# Patient Record
Sex: MALE | Race: WHITE | URBAN - NONMETROPOLITAN AREA
[De-identification: names, ages, dates, MRNs, and addresses within clinical notes are randomized per-mention and may not be internally consistent; named-entity substitution may affect disease eponyms.]

---

## 2016-11-11 LAB
CHOLESTEROL, TOTAL: 209 MG/DL
CHOLESTEROL/HDL RATIO: 5
HDLC SERPL-MCNC: 42 MG/DL (ref 35–70)
LDL CHOLESTEROL CALCULATED: 131.4 MG/DL (ref 0–160)
TRIGL SERPL-MCNC: 178 MG/DL
VLDLC SERPL CALC-MCNC: 36 MG/DL

## 2017-06-08 DIAGNOSIS — N20.0 KIDNEY STONES: ICD-10-CM

## 2017-06-08 DIAGNOSIS — E34.9 TESTOSTERONE DEFICIENCY: ICD-10-CM

## 2017-06-08 PROBLEM — I10 HYPERTENSION: Status: ACTIVE | Noted: 2017-06-08

## 2017-06-08 PROBLEM — E03.9 HYPOTHYROID: Status: ACTIVE | Noted: 2017-06-08

## 2017-06-08 RX ORDER — AMLODIPINE BESYLATE 5 MG/1
5 TABLET ORAL DAILY
COMMUNITY
End: 2017-06-12 | Stop reason: SDUPTHER

## 2017-06-08 RX ORDER — LISINOPRIL 10 MG/1
10 TABLET ORAL DAILY
COMMUNITY
End: 2017-06-12 | Stop reason: SDUPTHER

## 2017-06-12 ENCOUNTER — OFFICE VISIT (OUTPATIENT)
Dept: FAMILY MEDICINE CLINIC | Age: 42
End: 2017-06-12
Payer: COMMERCIAL

## 2017-06-12 VITALS
HEIGHT: 68 IN | HEART RATE: 68 BPM | WEIGHT: 222 LBS | SYSTOLIC BLOOD PRESSURE: 112 MMHG | DIASTOLIC BLOOD PRESSURE: 82 MMHG | BODY MASS INDEX: 33.65 KG/M2

## 2017-06-12 DIAGNOSIS — I10 ESSENTIAL HYPERTENSION: Primary | ICD-10-CM

## 2017-06-12 DIAGNOSIS — E34.9 TESTOSTERONE DEFICIENCY: ICD-10-CM

## 2017-06-12 DIAGNOSIS — N20.0 KIDNEY STONES: ICD-10-CM

## 2017-06-12 DIAGNOSIS — E03.9 HYPOTHYROIDISM, UNSPECIFIED TYPE: ICD-10-CM

## 2017-06-12 PROCEDURE — 99213 OFFICE O/P EST LOW 20 MIN: CPT | Performed by: FAMILY MEDICINE

## 2017-06-12 RX ORDER — AMLODIPINE BESYLATE 5 MG/1
5 TABLET ORAL DAILY
Qty: 90 TABLET | Refills: 3 | Status: SHIPPED | OUTPATIENT
Start: 2017-06-12 | End: 2017-12-13 | Stop reason: DRUGHIGH

## 2017-06-12 RX ORDER — LISINOPRIL 10 MG/1
10 TABLET ORAL DAILY
Qty: 90 TABLET | Refills: 3 | Status: SHIPPED | OUTPATIENT
Start: 2017-06-12

## 2017-06-12 ASSESSMENT — ENCOUNTER SYMPTOMS
CHEST TIGHTNESS: 0
WHEEZING: 0
SORE THROAT: 0
ABDOMINAL PAIN: 0
ABDOMINAL DISTENTION: 0
SHORTNESS OF BREATH: 0

## 2017-06-12 ASSESSMENT — PATIENT HEALTH QUESTIONNAIRE - PHQ9
SUM OF ALL RESPONSES TO PHQ QUESTIONS 1-9: 0
1. LITTLE INTEREST OR PLEASURE IN DOING THINGS: 0
2. FEELING DOWN, DEPRESSED OR HOPELESS: 0
SUM OF ALL RESPONSES TO PHQ9 QUESTIONS 1 & 2: 0

## 2017-09-11 ENCOUNTER — OFFICE VISIT (OUTPATIENT)
Dept: FAMILY MEDICINE CLINIC | Age: 42
End: 2017-09-11
Payer: COMMERCIAL

## 2017-09-11 VITALS
BODY MASS INDEX: 35.28 KG/M2 | DIASTOLIC BLOOD PRESSURE: 90 MMHG | WEIGHT: 232 LBS | SYSTOLIC BLOOD PRESSURE: 140 MMHG | OXYGEN SATURATION: 97 % | HEART RATE: 78 BPM

## 2017-09-11 DIAGNOSIS — G47.33 OSA (OBSTRUCTIVE SLEEP APNEA): ICD-10-CM

## 2017-09-11 DIAGNOSIS — E03.9 ACQUIRED HYPOTHYROIDISM: ICD-10-CM

## 2017-09-11 DIAGNOSIS — R07.89 CHEST TIGHTNESS OR PRESSURE: ICD-10-CM

## 2017-09-11 DIAGNOSIS — Z23 NEED FOR IMMUNIZATION AGAINST INFLUENZA: ICD-10-CM

## 2017-09-11 DIAGNOSIS — R06.02 SHORTNESS OF BREATH: Primary | ICD-10-CM

## 2017-09-11 DIAGNOSIS — I10 ESSENTIAL HYPERTENSION: ICD-10-CM

## 2017-09-11 LAB
BASOPHILS # BLD: 0.12 THOU/MM3
DIFFERENTIAL: AUTOMATED DIFF
DLCO %PRED: NORMAL
DLCO/VA %PRED: NORMAL
DLCO: NORMAL ML/MMHG SEC
EOSINOPHIL # BLD: 0.16 THOU/MM3
FEF 25-75% PRE PRED: NORMAL
FEF 25-75%-PRE: NORMAL
FEV1 %PRED-PRE: NORMAL
FEV1/FVC PRED: NORMAL
FEV1/FVC: NORMAL %
FEV1: NORMAL LITERS
FEV3 PRE: NORMAL
FVC %PRED-PRE: NORMAL
FVC: NORMAL LITERS
HCT VFR BLD CALC: 48.2 %
HEMOGLOBIN: 15.6 G/DL
LYMPHOCYTES # BLD: 2.49 THOU/MM3
MCH RBC QN AUTO: 28.9 PG
MCHC RBC AUTO-ENTMCNC: 32.4 G/DL
MCV RBC AUTO: 89.2 FL
MEP: NORMAL
MIP: NORMAL
MONOCYTES # BLD: 0.92 THOU/MM3
NEUTROPHILS: 5.97 THOU/MM3
PDW BLD-RTO: 11.9 %
PEF %PRED-PRE: NORMAL
PEF-PRE: NORMAL L/SEC
PLATELET # BLD: 332 THOU/MM3
PMV BLD AUTO: 7.3 FL
RBC # BLD: 5.4 M/UL
TLC %PRED: NORMAL
TLC: NORMAL LITERS
TROPONIN: <0.012 NG/ML
TSH SERPL DL<=0.05 MIU/L-ACNC: 3.92 MIU/ML
WBC # BLD: 9.66 THOU/ML3

## 2017-09-11 PROCEDURE — 90688 IIV4 VACCINE SPLT 0.5 ML IM: CPT | Performed by: FAMILY MEDICINE

## 2017-09-11 PROCEDURE — 99214 OFFICE O/P EST MOD 30 MIN: CPT | Performed by: FAMILY MEDICINE

## 2017-09-11 PROCEDURE — 93000 ELECTROCARDIOGRAM COMPLETE: CPT | Performed by: FAMILY MEDICINE

## 2017-09-11 PROCEDURE — 90471 IMMUNIZATION ADMIN: CPT | Performed by: FAMILY MEDICINE

## 2017-09-12 ASSESSMENT — ENCOUNTER SYMPTOMS
WHEEZING: 0
COUGH: 0
SORE THROAT: 0
GASTROINTESTINAL NEGATIVE: 1
SHORTNESS OF BREATH: 1
EYES NEGATIVE: 1
CHEST TIGHTNESS: 1

## 2017-09-14 DIAGNOSIS — R06.02 SHORTNESS OF BREATH: ICD-10-CM

## 2017-09-21 DIAGNOSIS — R07.89 CHEST TIGHTNESS OR PRESSURE: ICD-10-CM

## 2017-12-13 ENCOUNTER — OFFICE VISIT (OUTPATIENT)
Dept: FAMILY MEDICINE CLINIC | Age: 42
End: 2017-12-13
Payer: COMMERCIAL

## 2017-12-13 VITALS
WEIGHT: 232 LBS | DIASTOLIC BLOOD PRESSURE: 88 MMHG | SYSTOLIC BLOOD PRESSURE: 144 MMHG | HEIGHT: 67 IN | BODY MASS INDEX: 36.41 KG/M2 | HEART RATE: 88 BPM

## 2017-12-13 DIAGNOSIS — G47.33 OSA ON CPAP: ICD-10-CM

## 2017-12-13 DIAGNOSIS — Z99.89 OSA ON CPAP: ICD-10-CM

## 2017-12-13 DIAGNOSIS — I10 ESSENTIAL HYPERTENSION: Primary | ICD-10-CM

## 2017-12-13 DIAGNOSIS — E34.9 TESTOSTERONE DEFICIENCY: ICD-10-CM

## 2017-12-13 DIAGNOSIS — E03.9 ACQUIRED HYPOTHYROIDISM: ICD-10-CM

## 2017-12-13 DIAGNOSIS — N20.0 KIDNEY STONES: ICD-10-CM

## 2017-12-13 PROCEDURE — 99214 OFFICE O/P EST MOD 30 MIN: CPT | Performed by: FAMILY MEDICINE

## 2017-12-13 RX ORDER — TESTOSTERONE 75 MG/1
PELLET SUBCUTANEOUS
COMMUNITY
Start: 2017-12-08

## 2017-12-13 RX ORDER — AMLODIPINE BESYLATE 10 MG/1
10 TABLET ORAL DAILY
Qty: 90 TABLET | Refills: 3 | Status: SHIPPED | OUTPATIENT
Start: 2017-12-13

## 2017-12-13 ASSESSMENT — ENCOUNTER SYMPTOMS
WHEEZING: 0
ABDOMINAL DISTENTION: 0
CHEST TIGHTNESS: 0
ABDOMINAL PAIN: 0
SORE THROAT: 0
SHORTNESS OF BREATH: 0

## 2017-12-13 NOTE — PROGRESS NOTES
[address] [address] [address]  Dept: [phone]  Dept Fax: [phone]    Casey Fernandez is a 38 y.o. male who presents today for his medical conditions/complaints as noted below. Casey Fernandez is c/o of 6 Month Follow-Up; Hypertension (denies chest pains, palpatations, leg edema. c/o sob, dizziness after taking medication, ); and Hypothyroidism (denies fatigue, skin changes, appetite changes, bowel changes, palpatations)      HPI:     HPI   Hypertension  Checking blood pressures ocassionally; 135-150 in mornings   well controlled; BP: (!) 144/88   Lisinopril makes him pee for a while; feels a little lightheaded about an hour after he takes it; takes it before work (works 3rd shift) and takes amlodipine before bed  No chest pain  No edema   COLT  Started  CPAP, \"unconscious removal \" a few weeks ago but he  doesn't wear it more than an hour or so; Dr Tatiana Ballesteros tells him he has unconscious removal   Feels rested upon awakening  Denies excessive somnolence  Pulmonologist: Dr Tatiana Ballesteros   Hypothyroidism  Not needing medications  Last labs were normal      The 10-year CVD risk score (D'Agostino, et al., 2008) is: 11.7%    Values used to calculate the score:      Age: 38 years      Sex: Male      Diabetic: No      Tobacco smoker: No      Systolic Blood Pressure: 965 mmHg      Is BP treated: Yes      HDL Cholesterol: 42 mg/dL      Total Cholesterol: 209 mg/dL     hypotestosteronism  Goes every 3 months for depot from Dr Neda Scruggs he feels a lot better after the depot shots   Lab Results   Component Value Date    TSH 3.92 09/11/2017           BP Readings from Last 3 Encounters:   12/13/17 (!) 144/88   09/11/17 (!) 140/90   06/12/17 112/82            (goal 120/80)    Past Medical History:   Diagnosis Date    Hypertension     Kidney stones     Testosterone deficiency       No past surgical history on file.   Family History   Problem Relation Age of Onset    Diabetes Mother     Other Father      MVA     Social History   Substance Use Topics    Smoking status: Never Smoker    Smokeless tobacco: Never Used    Alcohol use Yes        Current Outpatient Prescriptions   Medication Sig Dispense Refill    TESTOPEL 75 MG PLLT       amLODIPine (NORVASC) 10 MG tablet Take 1 tablet by mouth daily 90 tablet 3    lisinopril (PRINIVIL;ZESTRIL) 10 MG tablet Take 1 tablet by mouth daily 90 tablet 3     No current facility-administered medications for this visit. No Known Allergies    Health Maintenance   Topic Date Due    DTaP/Tdap/Td vaccine (1 - Tdap) 09/09/1992    Diabetes screen  09/09/2013    HIV screen  12/13/2027 (Originally 9/9/1988)    Lipid screen  11/11/2021    Flu vaccine  Completed       Subjective:      Review of Systems   Constitutional: Negative for chills, diaphoresis and fever. HENT: Negative for sore throat. Respiratory: Negative for chest tightness, shortness of breath and wheezing. Cardiovascular: Negative for chest pain, palpitations and leg swelling. Gastrointestinal: Negative for abdominal distention and abdominal pain. Genitourinary: Negative for difficulty urinating, dysuria and hematuria. Hematological: Negative for adenopathy. Objective:     BP (!) 144/88   Pulse 88   Ht 5' 7\" (1.702 m)   Wt 232 lb (105.2 kg)   BMI 36.34 kg/m²     Physical Exam   Constitutional: He appears well-developed and well-nourished. No distress. HENT:   Head: Normocephalic and atraumatic. Right Ear: Tympanic membrane normal.   Left Ear: Tympanic membrane normal.   Nose: Nose normal.   Mouth/Throat: No posterior oropharyngeal edema or posterior oropharyngeal erythema. Oropharynx is constricted;   Neck: Neck supple. Carotid bruit is not present. No tracheal deviation present. No thyromegaly present. Cardiovascular: Normal rate, regular rhythm, S1 normal, S2 normal and intact distal pulses. No extrasystoles are present.  Exam reveals no